# Patient Record
Sex: FEMALE | Race: WHITE | NOT HISPANIC OR LATINO | Employment: UNEMPLOYED | URBAN - METROPOLITAN AREA
[De-identification: names, ages, dates, MRNs, and addresses within clinical notes are randomized per-mention and may not be internally consistent; named-entity substitution may affect disease eponyms.]

---

## 2022-08-18 ENCOUNTER — HOSPITAL ENCOUNTER (EMERGENCY)
Facility: HOSPITAL | Age: 52
Discharge: HOME | End: 2022-08-18
Attending: EMERGENCY MEDICINE
Payer: COMMERCIAL

## 2022-08-18 VITALS
DIASTOLIC BLOOD PRESSURE: 87 MMHG | WEIGHT: 131 LBS | TEMPERATURE: 98.4 F | BODY MASS INDEX: 22.14 KG/M2 | RESPIRATION RATE: 20 BRPM | HEART RATE: 105 BPM | OXYGEN SATURATION: 100 % | SYSTOLIC BLOOD PRESSURE: 144 MMHG

## 2022-08-18 DIAGNOSIS — T63.441A BEE STING: Primary | ICD-10-CM

## 2022-08-18 PROCEDURE — 99281 EMR DPT VST MAYX REQ PHY/QHP: CPT

## 2022-08-18 PROCEDURE — 99284 EMERGENCY DEPT VISIT MOD MDM: CPT | Performed by: EMERGENCY MEDICINE

## 2022-08-18 RX ORDER — DIPHENHYDRAMINE HCL 25 MG
25 TABLET ORAL ONCE
Status: COMPLETED | OUTPATIENT
Start: 2022-08-18 | End: 2022-08-18

## 2022-08-18 RX ORDER — ACETAMINOPHEN 325 MG/1
650 TABLET ORAL ONCE
Status: COMPLETED | OUTPATIENT
Start: 2022-08-18 | End: 2022-08-18

## 2022-08-18 RX ORDER — EPINEPHRINE 0.3 MG/.3ML
0.3 INJECTION SUBCUTANEOUS ONCE
Qty: 0.6 ML | Refills: 0 | Status: SHIPPED | OUTPATIENT
Start: 2022-08-18 | End: 2022-08-18

## 2022-08-18 RX ORDER — IBUPROFEN 400 MG/1
400 TABLET ORAL ONCE
Status: COMPLETED | OUTPATIENT
Start: 2022-08-18 | End: 2022-08-18

## 2022-08-18 RX ADMIN — IBUPROFEN 400 MG: 400 TABLET, FILM COATED ORAL at 11:43

## 2022-08-18 RX ADMIN — DIPHENHYDRAMINE HYDROCHLORIDE 25 MG: 25 TABLET ORAL at 11:43

## 2022-08-18 RX ADMIN — ACETAMINOPHEN 650 MG: 325 TABLET ORAL at 11:44

## 2022-08-18 NOTE — DISCHARGE INSTRUCTIONS
You can use tylenol and motrin as needed for pain  You can also use benadryl as needed for itching  Please return to the emergency department if you develop face or mouth swelling, difficulty breathing, or anything else concerning to you

## 2022-08-18 NOTE — ED PROVIDER NOTES
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  She did not use the epipen and otherwise only took fenapil prior to arrival  She presented to the emergency department out of concern for a developing allergic reaction  She reports localized pain in the areas of the stings  She denies difficulty breathing, oral or facial swelling, abdominal pain  She has had slight nausea since the event but no vomiting  None       No past medical history on file  Past Surgical History:   Procedure Laterality Date    BREAST SURGERY Right     cyst removal    HEMORROIDECTOMY      WISDOM TOOTH EXTRACTION         No family history on file  I have reviewed and agree with the history as documented  E-Cigarette/Vaping     E-Cigarette/Vaping Substances     Social History     Tobacco Use    Smokeless tobacco: Former User   Substance Use Topics    Alcohol use: Yes     Alcohol/week: 7 0 standard drinks     Types: 7 Glasses of wine per week    Drug use: Never       Review of Systems   Constitutional: Negative for chills and fever  HENT: Negative for facial swelling and trouble swallowing  Eyes: Negative for visual disturbance  Respiratory: Negative for chest tightness and shortness of breath  Cardiovascular: Negative for chest pain and leg swelling  Gastrointestinal: Positive for nausea  Negative for abdominal pain and vomiting  Genitourinary: Negative for flank pain  Musculoskeletal: Negative for back pain and gait problem  Skin: Negative for pallor and rash  Neurological: Negative for syncope, weakness and light-headedness  All other systems reviewed and are negative  Physical Exam  Physical Exam  Vitals and nursing note reviewed  Constitutional:       General: She is not in acute distress  Appearance: She is well-developed  She is not ill-appearing  HENT:      Head: Normocephalic and atraumatic  Comments: No facial swelling  Nose: Nose normal       Mouth/Throat:      Mouth: Mucous membranes are moist       Pharynx: No oropharyngeal exudate or posterior oropharyngeal erythema  Comments: No intraoral swelling  Eyes:      Extraocular Movements: Extraocular movements intact  Cardiovascular:      Rate and Rhythm: Normal rate and regular rhythm  Heart sounds: No murmur heard  No friction rub  No gallop  Pulmonary:      Effort: Pulmonary effort is normal       Breath sounds: Normal breath sounds  No wheezing, rhonchi or rales  Abdominal:      General: There is no distension  Palpations: Abdomen is soft  Tenderness: There is no abdominal tenderness  Musculoskeletal:         General: Normal range of motion  Cervical back: Normal range of motion and neck supple  Skin:     General: Skin is warm and dry  Coloration: Skin is not pale  Findings: No rash  Comments: Localized erythematous macules and mild swelling noted in the areas of the bee stings - two lesions to the left posterior shoulder, one lesion to the right upper arm, one lesion to the left dorsal hand, two lesions to the upper abdomen  Neurological:      General: No focal deficit present  Mental Status: She is alert and oriented to person, place, and time     Psychiatric:         Behavior: Behavior normal          Vital Signs  ED Triage Vitals [08/18/22 1133]   Temperature Pulse Respirations Blood Pressure SpO2   98 4 °F (36 9 °C) 105 20 144/87 100 %      Temp Source Heart Rate Source Patient Position - Orthostatic VS BP Location FiO2 (%)   Oral Monitor Lying Left arm --      Pain Score       10 - Worst Possible Pain           Vitals:    08/18/22 1133   BP: 144/87   Pulse: 105   Patient Position - Orthostatic VS: Lying         Visual Acuity      ED Medications  Medications   acetaminophen (TYLENOL) tablet 650 mg (650 mg Oral Given 8/18/22 1144)   ibuprofen (MOTRIN) tablet 400 mg (400 mg Oral Given 8/18/22 1143)   diphenhydrAMINE (BENADRYL) tablet 25 mg (25 mg Oral Given 8/18/22 1143)       Diagnostic Studies  Results Reviewed     None                 No orders to display              Procedures  Procedures         ED Course  ED Course as of 08/18/22 2020   Thu Aug 18, 2022   1233 Patient re-evaluated  Feeling improved at this time  Continues to have no respiratory difficulty, clear lungs, no facial or oral swelling  Stable for discharge at this time  SBIRT 20yo+    Flowsheet Row Most Recent Value   SBIRT (23 yo +)    In order to provide better care to our patients, we are screening all of our patients for alcohol and drug use  Would it be okay to ask you these screening questions? Yes Filed at: 08/18/2022 1146   Initial Alcohol Screen: US AUDIT-C     1  How often do you have a drink containing alcohol? 6 Filed at: 08/18/2022 1146   2  How many drinks containing alcohol do you have on a typical day you are drinking? 1 Filed at: 08/18/2022 1146   3a  Male UNDER 65: How often do you have five or more drinks on one occasion? 0 Filed at: 08/18/2022 1146   3b  FEMALE Any Age, or MALE 65+: How often do you have 4 or more drinks on one occassion? 0 Filed at: 08/18/2022 1146   Audit-C Score 7 Filed at: 08/18/2022 1146   Full Alcohol Screen: US AUDIT    4  How often during the last year have you found that you were not able to stop drinking once you had started?  0 Filed at: 08/18/2022 1146   5  How often during past year have you failed to do what was normally expected of you because of drinking? 0 Filed at: 2022 1146   6  How often in past year have you needed a first drink in the morning to get yourself going after a heavy drinking session? 0 Filed at: 2022 1146   7  How often in past year have you had feeling of guilt or remorse after drinking? 0 Filed at: 2022 1146   8  How often in past year have you been unable to remember what happened night before because you had been drinking? 0 Filed at: 2022 1146   9  Have you or someone else been injured as a result of your drinking? 0 Filed at: 2022 1146   10  Has a relative, friend, doctor or other health worker been concerned about your drinking and suggested you cut down?  0 Filed at: 2022 1146   AUDIT Total Score 7 Filed at: 2022 1146   DOUG: How many times in the past year have you    Used an illegal drug or used a prescription medication for non-medical reasons? Never Filed at: 2022 1146                    MDM  Number of Diagnoses or Management Options  Bee sting: new and does not require workup  Diagnosis management comments: 15-year-old female who presents for evaluation after multiple bee stings  Patient asymptomatic apart from localized pain in the area of the stings  Stable vital signs  No symptoms or exam findings concerning for anaphylaxis  Will treat symptomatically with Tylenol, Motrin, and Benadryl and observe patient  Patient observed in the department for 1 hour with no development of additional symptoms  Patient feeling improved after symptomatic treatment  Advised follow-up with primary care physician  Refill for patient's EpiPen sent to patient's pharmacy as her prescription   Return precautions discussed         Amount and/or Complexity of Data Reviewed  Review and summarize past medical records: yes    Risk of Complications, Morbidity, and/or Mortality  Presenting problems: high  Diagnostic procedures: minimal  Management options: low    Patient Progress  Patient progress: improved      Disposition  Final diagnoses:   Bee sting     Time reflects when diagnosis was documented in both MDM as applicable and the Disposition within this note     Time User Action Codes Description Comment    8/18/2022 12:35 PM Matthews Primrose Add [[id number]] Bee sting       ED Disposition     ED Disposition   Discharge    Condition   Stable    Date/Time   Thu Aug 18, 2022 12:34 PM    Comment   Kaylake Lace discharge to home/self care  Follow-up Information     Follow up With Specialties Details Why Contact Info    Aletha Salazar MD Family Medicine In 2 days  [address]  [phone]            Discharge Medication List as of 8/18/2022 12:36 PM      START taking these medications    Details   EPINEPHrine (EPIPEN) 0 3 mg/0 3 mL SOAJ Inject 0 3 mL (0 3 mg total) into a muscle once for 1 dose, Starting Thu 8/18/2022, Normal             No discharge procedures on file      PDMP Review     None          ED Provider  Electronically Signed by           Merrill Grace MD  08/18/22 2020